# Patient Record
(demographics unavailable — no encounter records)

---

## 2018-03-16 NOTE — XRAY REPORT
FINAL REPORT



EXAM:  XR SPINE THORACIC 3V



HISTORY:  upper back and mid back pain 



TECHNIQUE:  AP, lateral and swimmer's views of the thoracic

spine.



PRIORS:  None.



FINDINGS:  

The vertebral body heights and disc spaces are well maintained. 

The alignment is normal. Pedicles are intact bilaterally at all

levels. The paraspinal soft tissues are unremarkable.



IMPRESSION:  

Normal thoracic spine.
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
